# Patient Record
Sex: FEMALE | Race: WHITE | NOT HISPANIC OR LATINO | URBAN - METROPOLITAN AREA
[De-identification: names, ages, dates, MRNs, and addresses within clinical notes are randomized per-mention and may not be internally consistent; named-entity substitution may affect disease eponyms.]

---

## 2021-10-18 ENCOUNTER — TRANSCRIBE ORDERS (OUTPATIENT)
Dept: ADMINISTRATIVE | Facility: HOSPITAL | Age: 61
End: 2021-10-18

## 2021-10-18 DIAGNOSIS — Z87.39 HISTORY OF HERNIATED INTERVERTEBRAL DISC: ICD-10-CM

## 2021-10-18 DIAGNOSIS — M54.16 RIGHT LUMBAR RADICULITIS: Primary | ICD-10-CM

## 2021-11-10 ENCOUNTER — HOSPITAL ENCOUNTER (OUTPATIENT)
Dept: MRI IMAGING | Facility: HOSPITAL | Age: 61
Discharge: HOME | End: 2021-11-10
Admitting: NURSE PRACTITIONER

## 2021-11-10 DIAGNOSIS — M54.16 RIGHT LUMBAR RADICULITIS: ICD-10-CM

## 2021-11-10 DIAGNOSIS — Z87.39 HISTORY OF HERNIATED INTERVERTEBRAL DISC: ICD-10-CM

## 2021-11-10 PROCEDURE — 72148 MRI LUMBAR SPINE W/O DYE: CPT

## 2022-01-03 NOTE — PROGRESS NOTES
The patient has a pain history of the following:  Lumbar disc displacement  Lumbar radiculopathy     Previous interventions that the patient has received include:   Epidural 2014 - improved pain    Pain medications include:  Meloxicam     Other conservative modalities which the patient reports using include:  Physical Therapy: no  Chiropractor: no  Massage Therapy: no  TENS: no  Neck or back surgery: no  Past pain management: yes    Past Significant Surgical History:  None     HPI:       CHIEF COMPLAINT: Back Pain    Jewell Garcia is a 61 y.o. female referred here by ANDREW Luciano. Jewell Garcia presents to the office for evaluation and treatment of Back Pain      Onset:  2014, but exacerbated in August   Inciting Event:  Lifting an awkward object   Location:  Low back   Pain: Pain described as ache, sharp, shooting and throbbing and some tingling. Located in the low back and does radiate into the hips bilaterally.  At first it was just into the right hip and then it started going into the left hip.  It did travel along the lateral aspect of the right lower extremity earlier this year.  Severity:  Pain rated as a 0 /10.  Apportions pain as 10%  back pain and 90% extremity pain.  Symptoms have been episodic.  Exacerbation:  Sitting for too long, sleeping.   Alleviation:  Changing positions.  Associated Symptoms:   She denies any new onset of bowel or bladder weakness, or saddle anesthesia. Denies balance problems or lower extremity incoordination.  Admits to weakness in her right leg.  Ambulates: Without assistive device        PEG Assessment   What number best describes your pain on average in the past week?0  What number best describes how, during the past week, pain has interfered with your enjoyment of life?0  What number best describes how, during the past week, pain has interfered with your general activity?  0        Current Outpatient Medications:   •  amLODIPine (NORVASC) 5 MG tablet, Take 5  mg by mouth Daily., Disp: , Rfl:   •  Calcium Carbonate-Vitamin D (Calcium 600+D) 600-200 MG-UNIT tablet, Take  by mouth., Disp: , Rfl:   •  cetirizine (zyrTEC) 10 MG tablet, Take 10 mg by mouth Daily., Disp: , Rfl:   •  cholecalciferol (Cholecalciferol) 25 MCG (1000 UT) tablet, Take 1,000 Units by mouth Daily., Disp: , Rfl:   •  losartan (COZAAR) 100 MG tablet, Take 100 mg by mouth Daily., Disp: , Rfl:   •  meloxicam (MOBIC) 15 MG tablet, Take 1 tablet by mouth Daily As Needed., Disp: , Rfl:   •  multivitamin with minerals (CENTRUM SILVER 50+WOMEN PO), Take 1 tablet by mouth Daily., Disp: , Rfl:   •  multivitamin with minerals (MULTIVITAMIN ADULTS PO), Take  by mouth., Disp: , Rfl:     The following portions of the patient's history were reviewed and updated as appropriate: allergies, current medications, past family history, past medical history, past social history, past surgical history and problem list.      REVIEW OF PERTINENT MEDICAL DATA    11/10/21 MRI OF THE LUMBAR SPINE WITHOUT CONTRAST 11/10/2021     CLINICAL HISTORY: Patient has history of herniated lumbar intervertebral  disc, has back pain, bilateral hip and leg pain, right lumbar  radiculitis.     TECHNIQUE: Sagittal T1, proton density and fat-suppressed T2-weighted  images were obtained of the lumbar spine, in addition axial T2-weighted  images were obtained from T12 to S2, thin cut axial T1-weighted images  were obtained angled through the interspaces from L3 to S1.     COMPARISON: There are no prior studies from [facility]  for comparison.     FINDINGS: The distal thoracic cord and the conus is normal in signal  intensity. The conus terminates at the L1-L2 interspace level which is  normal.     At T11-T12, T12-L1, L1-L2 and L2-L3, the disc spaces and facets are  normal with no canal or foraminal narrowing from T11 to L3.     There is a mild levoscoliotic curvature of the lumbar spine, its apex is  at the L3-L4 lumbar level.     At  L3-L4, there is mild bilateral facet overgrowth, mild disc space  narrowing with a 2 mm retrolisthesis of L3 on L4, and mild diffuse  annular disc bulge. There is only minimal canal and foraminal narrowing.     At L4-L5, there is mild bilateral facet overgrowth, some disc space  narrowing and mild degenerative endplate changes, 2-3 mm retrolisthesis  of L4 with respect to L5. There is a small right central-paracentral  disc herniation measuring 9 x 3 x 6 mm in mediolateral and  anteroposterior and craniocaudal dimension that extends along the right  posterior superior body and endplate of L4 that slightly narrows the  right lateral recess, abuts the anteromedial aspect of the traversing  right L5 nerve root. There is minimal if any foraminal narrowing at  L4-L5.     At L5-S1, there is minimal right facet overgrowth, left facets are  normal. There is mild disc space narrowing and disc desiccation and  minimal posterior disc osteophyte complex. There is no central canal or  lateral recess or foraminal narrowing.     IMPRESSION:  1. The disc spaces and facets are normal with no canal or foraminal  narrowing from T11 to L3.  2. At L3-L4, there is mild bilateral facet overgrowth and a 2 mm  retrolisthesis of L3 with respect to L4 and mild diffuse annular disc  bulge, but there is only minimal canal and foraminal narrowing.  3. At L4-L5, there is mild bilateral facet overgrowth, 3 mm  retrolisthesis of L4 with respect to L5 and there is a small right  central-paracentral disc herniation, thin rind of herniated disc  material extending along the right posterior superior body and endplate  of L5 that measures 9 x 3 x 6 mm mediolateral and anteroposterior and  craniocaudal dimension, mildly narrows the right side of the thecal sac  and the right lateral recess at the level of the right posterior  superior body and endplate of L5 where it abuts the anteromedial aspect  of the traversing right L5 nerve root. The remainder of  "the lumbar spine  MRI is unremarkable.     This report was finalized on 11/12/2021 6:28 AM by Dr. Afshin Knight M.D.      10/13/21 Creatinine 0.9, Platelets 379 (10*3)    Review of Systems   Constitutional: Positive for fatigue. Negative for activity change, chills and fever.   HENT: Negative for congestion.    Eyes: Negative for visual disturbance.   Respiratory: Negative for chest tightness and shortness of breath.    Cardiovascular: Negative for chest pain.   Gastrointestinal: Negative for abdominal pain, constipation and diarrhea.   Genitourinary: Negative for difficulty urinating, dyspareunia and dysuria.   Musculoskeletal: Positive for back pain.   Neurological: Negative for dizziness, weakness, light-headedness, numbness and headaches.   Psychiatric/Behavioral: Negative for agitation, self-injury, sleep disturbance and suicidal ideas. The patient is not nervous/anxious.      I have reviewed and confirmed the accuracy of the ROS as documented by the MA/LPN/RN Alicia Vargas MD      Vitals:    01/05/22 0807   BP: 145/73   Pulse: 69   Temp: 96.6 °F (35.9 °C)   SpO2: 95%   Weight: 52 kg (114 lb 9.6 oz)   Height: 157.5 cm (62\")   PainSc: 0-No pain         Objective   Physical Exam  Vitals reviewed.   Constitutional:       General: She is not in acute distress.  Pulmonary:      Effort: Pulmonary effort is normal. No respiratory distress.   Musculoskeletal:      Comments: Ambulation: Without assistive device  Lumbar Exam:  Appearance: Scoliotic curve absent and scarring absent  Able to tandem, toe, and heel walk: Yes  Palpated over lumbosacral paravertebral regions and transverse processes with negative tenderness appreciated, Bilateral.   Sacroiliac joints are not tender, Bilateral.  Trochanteric bursa are tender, Bilateral.  Straight leg raise is negative radiculopathy, Bilateral.  Slump test is negative  radiculopathy, Bilateral.  Facet loading is negative for pain, Bilateral.  Paraspinal/adjacent lumbar " musculature are not tender to palpation, Bilateral.  Leonardo Jagruti's test is negative sacroiliac pain, Bilateral.   Skin:     General: Skin is warm and dry.   Neurological:      General: No focal deficit present.      Mental Status: She is alert.   Psychiatric:         Mood and Affect: Mood normal.         Thought Content: Thought content normal.         Assessment/Plan   Diagnoses and all orders for this visit:    1. Displacement of lumbar intervertebral disc without myelopathy (Primary)    2. Lumbar radiculopathy    3. Lumbar facet arthropathy        - Pertinent labs reviewed.   - Pertinent imaging reviewed.   - Symptoms correlate with L4-5 disc displacement towards the right causing L5 radicular symptoms.  These have resolved for the time being.   - Explained if pain worsens, may consider lumbar Epidural steroid injection at L4-5 versus Transforaminal epidural steroid injection if one sided symptoms.     --- Follow-up PRN              While examining this patient, I wore protective equipment including a mask, eye shield and gloves.  I washed my hands before and after this patient encounter.  The patient wore a mask throughout the visit as well.     Alicia Vargas MD  Pain Management

## 2022-01-05 ENCOUNTER — OFFICE VISIT (OUTPATIENT)
Dept: PAIN MEDICINE | Facility: CLINIC | Age: 62
End: 2022-01-05

## 2022-01-05 VITALS
HEART RATE: 69 BPM | SYSTOLIC BLOOD PRESSURE: 145 MMHG | DIASTOLIC BLOOD PRESSURE: 73 MMHG | OXYGEN SATURATION: 95 % | WEIGHT: 114.6 LBS | HEIGHT: 62 IN | BODY MASS INDEX: 21.09 KG/M2 | TEMPERATURE: 96.6 F

## 2022-01-05 DIAGNOSIS — M51.26 DISPLACEMENT OF LUMBAR INTERVERTEBRAL DISC WITHOUT MYELOPATHY: Primary | ICD-10-CM

## 2022-01-05 DIAGNOSIS — M54.16 LUMBAR RADICULOPATHY: ICD-10-CM

## 2022-01-05 DIAGNOSIS — M47.816 LUMBAR FACET ARTHROPATHY: ICD-10-CM

## 2022-01-05 PROCEDURE — 99203 OFFICE O/P NEW LOW 30 MIN: CPT | Performed by: ANESTHESIOLOGY

## 2022-01-05 RX ORDER — CETIRIZINE HYDROCHLORIDE 10 MG/1
10 TABLET ORAL DAILY
COMMUNITY
Start: 2021-10-18

## 2022-01-05 RX ORDER — MULTIPLE VITAMINS W/ MINERALS TAB 9MG-400MCG
TAB ORAL
COMMUNITY
Start: 2014-02-19

## 2022-01-05 RX ORDER — MELATONIN
1000 DAILY
COMMUNITY

## 2022-01-05 RX ORDER — MELOXICAM 15 MG/1
1 TABLET ORAL DAILY PRN
COMMUNITY
Start: 2021-10-18 | End: 2022-10-18

## 2022-01-05 RX ORDER — AMLODIPINE BESYLATE 5 MG/1
5 TABLET ORAL DAILY
COMMUNITY
Start: 2021-12-02

## 2022-01-05 RX ORDER — MULTIPLE VITAMINS W/ MINERALS TAB 9MG-400MCG
1 TAB ORAL DAILY
COMMUNITY

## 2022-01-05 RX ORDER — LOSARTAN POTASSIUM 100 MG/1
100 TABLET ORAL DAILY
COMMUNITY
Start: 2022-01-03